# Patient Record
Sex: MALE | Race: OTHER | Employment: FULL TIME
[De-identification: names, ages, dates, MRNs, and addresses within clinical notes are randomized per-mention and may not be internally consistent; named-entity substitution may affect disease eponyms.]

---

## 2023-12-21 ENCOUNTER — APPOINTMENT (OUTPATIENT)
Facility: HOSPITAL | Age: 28
DRG: 100 | End: 2023-12-21

## 2023-12-21 ENCOUNTER — HOSPITAL ENCOUNTER (INPATIENT)
Facility: HOSPITAL | Age: 28
Discharge: HOME | DRG: 100 | End: 2023-12-24

## 2023-12-21 ENCOUNTER — HOSPITAL ENCOUNTER (INPATIENT)
Facility: HOSPITAL | Age: 28
LOS: 1 days | Discharge: HOME | DRG: 100 | End: 2023-12-22
Attending: EMERGENCY MEDICINE | Admitting: INTERNAL MEDICINE

## 2023-12-21 DIAGNOSIS — R90.89 ABNORMAL CT OF BRAIN: ICD-10-CM

## 2023-12-21 DIAGNOSIS — R56.9 SEIZURE (HCC): Primary | ICD-10-CM

## 2023-12-21 PROBLEM — E87.20 LACTIC ACIDOSIS: Status: ACTIVE | Noted: 2023-12-21

## 2023-12-21 LAB
ALBUMIN SERPL-MCNC: 4.1 G/DL (ref 3.5–5)
ALBUMIN/GLOB SERPL: 1 (ref 1.1–2.2)
ALP SERPL-CCNC: 80 U/L (ref 45–117)
ALT SERPL-CCNC: 30 U/L (ref 12–78)
AMPHET UR QL SCN: NEGATIVE
ANION GAP SERPL CALC-SCNC: 7 MMOL/L (ref 5–15)
AST SERPL-CCNC: 23 U/L (ref 15–37)
BARBITURATES UR QL SCN: NEGATIVE
BASOPHILS # BLD: 0.2 K/UL (ref 0–0.1)
BASOPHILS NFR BLD: 2 % (ref 0–1)
BENZODIAZ UR QL: POSITIVE
BILIRUB SERPL-MCNC: 0.7 MG/DL (ref 0.2–1)
BUN SERPL-MCNC: 15 MG/DL (ref 6–20)
BUN/CREAT SERPL: 13 (ref 12–20)
CALCIUM SERPL-MCNC: 9.3 MG/DL (ref 8.5–10.1)
CANNABINOIDS UR QL SCN: NEGATIVE
CHLORIDE SERPL-SCNC: 105 MMOL/L (ref 97–108)
CO2 SERPL-SCNC: 25 MMOL/L (ref 21–32)
COCAINE UR QL SCN: NEGATIVE
COMMENT:: NORMAL
CREAT SERPL-MCNC: 1.2 MG/DL (ref 0.7–1.3)
DIFFERENTIAL METHOD BLD: ABNORMAL
EOSINOPHIL # BLD: 0.3 K/UL (ref 0–0.4)
EOSINOPHIL NFR BLD: 3 % (ref 0–7)
ERYTHROCYTE [DISTWIDTH] IN BLOOD BY AUTOMATED COUNT: 12.9 % (ref 11.5–14.5)
GLOBULIN SER CALC-MCNC: 4 G/DL (ref 2–4)
GLUCOSE SERPL-MCNC: 105 MG/DL (ref 65–100)
HCT VFR BLD AUTO: 47.2 % (ref 36.6–50.3)
HGB BLD-MCNC: 15.6 G/DL (ref 12.1–17)
IMM GRANULOCYTES # BLD AUTO: 0 K/UL
IMM GRANULOCYTES NFR BLD AUTO: 0 %
LACTATE BLD-SCNC: 5.78 MMOL/L (ref 0.4–2)
LYMPHOCYTES # BLD: 2.7 K/UL (ref 0.8–3.5)
LYMPHOCYTES NFR BLD: 29 % (ref 12–49)
Lab: ABNORMAL
MAGNESIUM SERPL-MCNC: 2.2 MG/DL (ref 1.6–2.4)
MCH RBC QN AUTO: 26 PG (ref 26–34)
MCHC RBC AUTO-ENTMCNC: 33.1 G/DL (ref 30–36.5)
MCV RBC AUTO: 78.8 FL (ref 80–99)
METHADONE UR QL: NEGATIVE
MONOCYTES # BLD: 0.3 K/UL (ref 0–1)
MONOCYTES NFR BLD: 3 % (ref 5–13)
NEUTS BAND NFR BLD MANUAL: 6 % (ref 0–6)
NEUTS SEG # BLD: 5.7 K/UL (ref 1.8–8)
NEUTS SEG NFR BLD: 57 % (ref 32–75)
NRBC # BLD: 0 K/UL (ref 0–0.01)
NRBC BLD-RTO: 0 PER 100 WBC
OPIATES UR QL: NEGATIVE
PCP UR QL: NEGATIVE
PLATELET # BLD AUTO: 287 K/UL (ref 150–400)
PMV BLD AUTO: 9.7 FL (ref 8.9–12.9)
POTASSIUM SERPL-SCNC: 4 MMOL/L (ref 3.5–5.1)
PROT SERPL-MCNC: 8.1 G/DL (ref 6.4–8.2)
RBC # BLD AUTO: 5.99 M/UL (ref 4.1–5.7)
RBC MORPH BLD: ABNORMAL
SODIUM SERPL-SCNC: 137 MMOL/L (ref 136–145)
SPECIMEN HOLD: NORMAL
SPECIMEN HOLD: NORMAL
TROPONIN I SERPL HS-MCNC: <4 NG/L (ref 0–76)
WBC # BLD AUTO: 9.2 K/UL (ref 4.1–11.1)
WBC MORPH BLD: ABNORMAL

## 2023-12-21 PROCEDURE — 36415 COLL VENOUS BLD VENIPUNCTURE: CPT

## 2023-12-21 PROCEDURE — 83735 ASSAY OF MAGNESIUM: CPT

## 2023-12-21 PROCEDURE — 4A03X5D MEASUREMENT OF ARTERIAL FLOW, INTRACRANIAL, EXTERNAL APPROACH: ICD-10-PCS | Performed by: INTERNAL MEDICINE

## 2023-12-21 PROCEDURE — 6360000002 HC RX W HCPCS: Performed by: EMERGENCY MEDICINE

## 2023-12-21 PROCEDURE — 96374 THER/PROPH/DIAG INJ IV PUSH: CPT

## 2023-12-21 PROCEDURE — 6360000004 HC RX CONTRAST MEDICATION: Performed by: RADIOLOGY

## 2023-12-21 PROCEDURE — 70450 CT HEAD/BRAIN W/O DYE: CPT

## 2023-12-21 PROCEDURE — A9579 GAD-BASE MR CONTRAST NOS,1ML: HCPCS | Performed by: RADIOLOGY

## 2023-12-21 PROCEDURE — 99285 EMERGENCY DEPT VISIT HI MDM: CPT

## 2023-12-21 PROCEDURE — 2580000003 HC RX 258: Performed by: INTERNAL MEDICINE

## 2023-12-21 PROCEDURE — 80053 COMPREHEN METABOLIC PANEL: CPT

## 2023-12-21 PROCEDURE — 1100000000 HC RM PRIVATE

## 2023-12-21 PROCEDURE — 6370000000 HC RX 637 (ALT 250 FOR IP): Performed by: INTERNAL MEDICINE

## 2023-12-21 PROCEDURE — 80307 DRUG TEST PRSMV CHEM ANLYZR: CPT

## 2023-12-21 PROCEDURE — 71045 X-RAY EXAM CHEST 1 VIEW: CPT

## 2023-12-21 PROCEDURE — 70553 MRI BRAIN STEM W/O & W/DYE: CPT

## 2023-12-21 PROCEDURE — 6360000004 HC RX CONTRAST MEDICATION: Performed by: EMERGENCY MEDICINE

## 2023-12-21 PROCEDURE — 70498 CT ANGIOGRAPHY NECK: CPT

## 2023-12-21 PROCEDURE — 2580000003 HC RX 258: Performed by: EMERGENCY MEDICINE

## 2023-12-21 PROCEDURE — 83605 ASSAY OF LACTIC ACID: CPT

## 2023-12-21 PROCEDURE — 84484 ASSAY OF TROPONIN QUANT: CPT

## 2023-12-21 PROCEDURE — 6360000002 HC RX W HCPCS: Performed by: INTERNAL MEDICINE

## 2023-12-21 PROCEDURE — 85025 COMPLETE CBC W/AUTO DIFF WBC: CPT

## 2023-12-21 RX ORDER — ACETAMINOPHEN 650 MG/1
650 SUPPOSITORY RECTAL EVERY 6 HOURS PRN
Status: DISCONTINUED | OUTPATIENT
Start: 2023-12-21 | End: 2023-12-22 | Stop reason: HOSPADM

## 2023-12-21 RX ORDER — SODIUM CHLORIDE 9 MG/ML
INJECTION, SOLUTION INTRAVENOUS PRN
Status: DISCONTINUED | OUTPATIENT
Start: 2023-12-21 | End: 2023-12-22 | Stop reason: HOSPADM

## 2023-12-21 RX ORDER — POLYETHYLENE GLYCOL 3350 17 G/17G
17 POWDER, FOR SOLUTION ORAL DAILY PRN
Status: DISCONTINUED | OUTPATIENT
Start: 2023-12-21 | End: 2023-12-22 | Stop reason: HOSPADM

## 2023-12-21 RX ORDER — LEVETIRACETAM 500 MG/5ML
500 INJECTION, SOLUTION, CONCENTRATE INTRAVENOUS EVERY 12 HOURS
Status: DISCONTINUED | OUTPATIENT
Start: 2023-12-21 | End: 2023-12-22 | Stop reason: HOSPADM

## 2023-12-21 RX ORDER — ENOXAPARIN SODIUM 100 MG/ML
40 INJECTION SUBCUTANEOUS
Status: DISCONTINUED | OUTPATIENT
Start: 2023-12-21 | End: 2023-12-22 | Stop reason: HOSPADM

## 2023-12-21 RX ORDER — 0.9 % SODIUM CHLORIDE 0.9 %
1000 INTRAVENOUS SOLUTION INTRAVENOUS ONCE
Status: COMPLETED | OUTPATIENT
Start: 2023-12-21 | End: 2023-12-21

## 2023-12-21 RX ORDER — SODIUM CHLORIDE 0.9 % (FLUSH) 0.9 %
5-40 SYRINGE (ML) INJECTION PRN
Status: DISCONTINUED | OUTPATIENT
Start: 2023-12-21 | End: 2023-12-22 | Stop reason: HOSPADM

## 2023-12-21 RX ORDER — POTASSIUM CHLORIDE 7.45 MG/ML
10 INJECTION INTRAVENOUS PRN
Status: DISCONTINUED | OUTPATIENT
Start: 2023-12-21 | End: 2023-12-22 | Stop reason: HOSPADM

## 2023-12-21 RX ORDER — ONDANSETRON 2 MG/ML
4 INJECTION INTRAMUSCULAR; INTRAVENOUS EVERY 6 HOURS PRN
Status: DISCONTINUED | OUTPATIENT
Start: 2023-12-21 | End: 2023-12-22 | Stop reason: HOSPADM

## 2023-12-21 RX ORDER — POTASSIUM CHLORIDE 750 MG/1
40 TABLET, FILM COATED, EXTENDED RELEASE ORAL PRN
Status: DISCONTINUED | OUTPATIENT
Start: 2023-12-21 | End: 2023-12-22 | Stop reason: HOSPADM

## 2023-12-21 RX ORDER — SODIUM CHLORIDE 0.9 % (FLUSH) 0.9 %
5-40 SYRINGE (ML) INJECTION EVERY 12 HOURS SCHEDULED
Status: DISCONTINUED | OUTPATIENT
Start: 2023-12-21 | End: 2023-12-22 | Stop reason: HOSPADM

## 2023-12-21 RX ORDER — ACETAMINOPHEN 325 MG/1
650 TABLET ORAL EVERY 6 HOURS PRN
Status: DISCONTINUED | OUTPATIENT
Start: 2023-12-21 | End: 2023-12-22 | Stop reason: HOSPADM

## 2023-12-21 RX ORDER — LEVETIRACETAM 500 MG/5ML
1000 INJECTION, SOLUTION, CONCENTRATE INTRAVENOUS ONCE
Status: COMPLETED | OUTPATIENT
Start: 2023-12-21 | End: 2023-12-21

## 2023-12-21 RX ORDER — MIDAZOLAM HYDROCHLORIDE 2 MG/2ML
0.5 INJECTION, SOLUTION INTRAMUSCULAR; INTRAVENOUS ONCE
Status: DISCONTINUED | OUTPATIENT
Start: 2023-12-21 | End: 2023-12-21

## 2023-12-21 RX ORDER — MAGNESIUM SULFATE IN WATER 40 MG/ML
2000 INJECTION, SOLUTION INTRAVENOUS PRN
Status: DISCONTINUED | OUTPATIENT
Start: 2023-12-21 | End: 2023-12-22 | Stop reason: HOSPADM

## 2023-12-21 RX ORDER — ONDANSETRON 4 MG/1
4 TABLET, ORALLY DISINTEGRATING ORAL EVERY 8 HOURS PRN
Status: DISCONTINUED | OUTPATIENT
Start: 2023-12-21 | End: 2023-12-22 | Stop reason: HOSPADM

## 2023-12-21 RX ORDER — MIDAZOLAM HYDROCHLORIDE 2 MG/2ML
1 INJECTION, SOLUTION INTRAMUSCULAR; INTRAVENOUS ONCE
Status: COMPLETED | OUTPATIENT
Start: 2023-12-21 | End: 2023-12-21

## 2023-12-21 RX ORDER — SODIUM CHLORIDE 9 MG/ML
INJECTION, SOLUTION INTRAVENOUS CONTINUOUS
Status: DISCONTINUED | OUTPATIENT
Start: 2023-12-21 | End: 2023-12-22 | Stop reason: HOSPADM

## 2023-12-21 RX ADMIN — SODIUM CHLORIDE 1000 ML: 9 INJECTION, SOLUTION INTRAVENOUS at 13:15

## 2023-12-21 RX ADMIN — MIDAZOLAM HYDROCHLORIDE 1 MG: 1 INJECTION, SOLUTION INTRAMUSCULAR; INTRAVENOUS at 13:17

## 2023-12-21 RX ADMIN — LEVETIRACETAM 500 MG: 100 INJECTION, SOLUTION INTRAVENOUS at 21:03

## 2023-12-21 RX ADMIN — SALINE NASAL SPRAY 1 SPRAY: 1.5 SOLUTION NASAL at 16:55

## 2023-12-21 RX ADMIN — LEVETIRACETAM 1000 MG: 100 INJECTION, SOLUTION INTRAVENOUS at 15:03

## 2023-12-21 RX ADMIN — GADOTERIDOL 15 ML: 279.3 INJECTION, SOLUTION INTRAVENOUS at 21:59

## 2023-12-21 RX ADMIN — IOPAMIDOL 100 ML: 755 INJECTION, SOLUTION INTRAVENOUS at 14:50

## 2023-12-21 RX ADMIN — SODIUM CHLORIDE: 9 INJECTION, SOLUTION INTRAVENOUS at 16:42

## 2023-12-21 NOTE — ED TRIAGE NOTES
Patient arrived via EMS from [address] [place] [address] Dr. Per EMS, patient had witnessed seizure like activity with full body convulsions for 30 seconds that resolved spontaneously. Patient was assisted to ground. + LOC. Patient reports he was working this morning and went to [employer] to  lumber. Patient reports pain in upper back.  Denies abd pain, n/v/d, fevers/chills     + sore throat     Maribel Frankel EMT-P used for translation

## 2023-12-21 NOTE — DISCHARGE INSTRUCTIONS
HOSPITALIST DISCHARGE INSTRUCTIONS  NAME: Digna Carr   :  1995   MRN:  [id number]     Date/Time:  2023 1:13 PM    ADMIT DATE: 2023     DISCHARGE DATE: 2023         DISCHARGE DIAGNOSIS:  Seizure  Left frontal lobe AVM      DIET:  regular    ACTIVITY:  as tolerated    OTHER INSTRUCTIONS:    Do not drive. (this is to include: car, bicycle, or motorcycle)  Do not drive operate dangerous equipment such as power tools, heavy machinery with moving parts, or an open flame. Do not swim alone (this would include a bathtub full of water is a small swimming          MEDICATIONS:    It is important that you take the medication exactly as they are prescribed. Keep your medication in the bottles provided by the pharmacist and keep a list of the medication names, dosages, and times to be taken in your wallet. Do not take other medications without consulting your doctor. If you experience any of the following symptoms then please call your primary care physician or return to the emergency room if you cannot get hold of your doctor:  Fever, chills, nausea, vomiting, diarrhea, change in mentation, falling, bleeding, shortness of breath    Follow Up:  Please call and set up an appointment to see them in 1 week. Abhi Maher MD  [address] [place] [address]  [phone]    Schedule an appointment as soon as possible for a visit in 4 week(s)      Mata Sadler MD  [address] [place] [address] [address] [place] [address]  [phone]    Schedule an appointment as soon as possible for a visit          Information obtained by :  I understand that if any problems occur once I am at home I am to contact my physician. I understand and acknowledge receipt of the instructions indicated above.                                                                                                                                                Physician's or R.N.'s Signature

## 2023-12-21 NOTE — ED PROVIDER NOTES
with abnormal hyperdensity, order MRI with and without contrast, patient neurology. Notified that neurology will be by to evaluate and to order MRI w/wo    Discussed with neurology, recommending Keppra load, CTA as well to assess for vascular pathology in addition to the MRI. Will recommend admission to the hospital service and recommends Keppra 500 twice daily. Amount and/or Complexity of Data Reviewed  Labs: ordered. Decision-making details documented in ED Course. Radiology: ordered. ECG/medicine tests: ordered. Risk  Prescription drug management. Decision regarding hospitalization. REASSESSMENT     ED Course as of 12/21/23 1457   Thu Dec 21, 2023   1323 ECG obtained December 21, 2023 at 1300. Sinus tachycardia to 118, normal axis. Otherwise normal intervals. No ST segment changes. No STEMI [SK]   1400 Abnormal CT, will plan for MRI with and without contrast, patient neurology. Discussed with family as well 2. They are worried regarding if this is related to his chronic cough issues, additionally is noted that he has been using Afrin daily for a while due to his congestion symptoms. Discussed that this could also lead to hypertensive readings and rebound congestion at discontinuation.   Patient is pending MRI for further assessment and neurology [SK]   1406 CMP(!):    Sodium 137   Potassium 4.0   Chloride 105   CO2 25   Anion Gap 7   Glucose, Random 105(!)   BUN,BUNPL 15   Creatinine 1.20   Bun/Cre Ratio 13   Est, Glom Filt Rate >60   CALCIUM, SERUM, 709529 9.3   BILIRUBIN TOTAL 0.7   ALT 30   AST 23   Alk Phos 80   Total Protein 8.1   Albumin 4.1   Globulin 4.0   ALBUMIN/GLOBULIN RATIO 1.0(!)  Reassuring electrolytes [SK]   1406 CBC with Auto Differential(!):    WBC 9.2   RBC 5.99(!)   Hemoglobin Quant 15.6   Hematocrit 47.2   MCV 78.8(!)   MCH 26.0   MCHC 33.1   RDW 12.9   Platelet Count 107   MPV 9.7   Nucleated Red Blood Cells 0.0   Nucleated Red Blood Cells 0.00   Neutrophils % 57

## 2023-12-21 NOTE — H&P
[address] [place] [address] [address] [phone]    Hospital Medicine Admission History and Physical      NAME:  Kamila Fernandez   :   1995   MRN:  [id number]     PCP:  No primary care provider on file. Date of service:  2023         Subjective:     CHIEF COMPLAINT: Seizure    HISTORY OF PRESENT ILLNESS:     Mr. Thomas Lynn is a 29 y.o.  male who is admitted with seizure. Mr. Thomas Lynn without significant past medical history presented after he had a seizure episode. Patient was at a store when he suddenly started to stare blank and then lost consciousness. Later noted to have foaming his mouth, tonic-clonic body movements later on with rigidity of his body, which lasted for about 60 minutes. Later he had postictal.  No history of seizures in the past and not taking any medication, no alcohol or drug use. No past medical history on file. No past surgical history on file. Social History     Tobacco Use    Smoking status: Not on file    Smokeless tobacco: Not on file   Substance Use Topics    Alcohol use: Not on file        No family history on file.      No Known Allergies     Prior to Admission medications    Not on File         Review of Systems:  (bold if positive, if negative)    Gen:  Eyes:  ENT:  CVS:  Pulm:  GI:  :  MS:  Skin:  Psych:  Endo:  Hem:  Renal:  Neuro:  seizure          Objective:      VITALS:    Vital signs reviewed; most recent are:    Vitals:    23 1515   BP: 136/65   Pulse: (!) 111   Resp: 24   Temp:    SpO2: 99%     SpO2 Readings from Last 6 Encounters:   23 99%        No intake or output data in the 24 hours ending 23 1537         Exam:     Physical Exam:    Gen:  Well-developed, well-nourished, in no acute distress  HEENT:  Pink conjunctivae, PERRL, hearing intact to voice, moist mucous membranes  Neck:  Supple, without masses, thyroid non-tender  Resp:  No accessory muscle use, clear breath

## 2023-12-22 VITALS
RESPIRATION RATE: 16 BRPM | TEMPERATURE: 98 F | HEART RATE: 82 BPM | SYSTOLIC BLOOD PRESSURE: 136 MMHG | BODY MASS INDEX: 25.19 KG/M2 | WEIGHT: 179.9 LBS | OXYGEN SATURATION: 98 % | HEIGHT: 71 IN | DIASTOLIC BLOOD PRESSURE: 65 MMHG

## 2023-12-22 LAB
ANION GAP SERPL CALC-SCNC: 3 MMOL/L (ref 5–15)
BASOPHILS # BLD: 0 K/UL (ref 0–0.1)
BASOPHILS NFR BLD: 0 % (ref 0–1)
BUN SERPL-MCNC: 12 MG/DL (ref 6–20)
BUN/CREAT SERPL: 11 (ref 12–20)
CALCIUM SERPL-MCNC: 8.7 MG/DL (ref 8.5–10.1)
CHLORIDE SERPL-SCNC: 110 MMOL/L (ref 97–108)
CO2 SERPL-SCNC: 26 MMOL/L (ref 21–32)
CREAT SERPL-MCNC: 1.05 MG/DL (ref 0.7–1.3)
DIFFERENTIAL METHOD BLD: ABNORMAL
EOSINOPHIL # BLD: 0.1 K/UL (ref 0–0.4)
EOSINOPHIL NFR BLD: 1 % (ref 0–7)
ERYTHROCYTE [DISTWIDTH] IN BLOOD BY AUTOMATED COUNT: 13.1 % (ref 11.5–14.5)
GLUCOSE SERPL-MCNC: 99 MG/DL (ref 65–100)
HCT VFR BLD AUTO: 43.5 % (ref 36.6–50.3)
HGB BLD-MCNC: 14.3 G/DL (ref 12.1–17)
IMM GRANULOCYTES # BLD AUTO: 0 K/UL (ref 0–0.04)
IMM GRANULOCYTES NFR BLD AUTO: 0 % (ref 0–0.5)
LACTATE SERPL-SCNC: 1.1 MMOL/L (ref 0.4–2)
LYMPHOCYTES # BLD: 2.5 K/UL (ref 0.8–3.5)
LYMPHOCYTES NFR BLD: 25 % (ref 12–49)
MCH RBC QN AUTO: 26.1 PG (ref 26–34)
MCHC RBC AUTO-ENTMCNC: 32.9 G/DL (ref 30–36.5)
MCV RBC AUTO: 79.4 FL (ref 80–99)
MONOCYTES # BLD: 0.8 K/UL (ref 0–1)
MONOCYTES NFR BLD: 7 % (ref 5–13)
NEUTS SEG # BLD: 6.7 K/UL (ref 1.8–8)
NEUTS SEG NFR BLD: 67 % (ref 32–75)
NRBC # BLD: 0 K/UL (ref 0–0.01)
NRBC BLD-RTO: 0 PER 100 WBC
PLATELET # BLD AUTO: 263 K/UL (ref 150–400)
PMV BLD AUTO: 9.4 FL (ref 8.9–12.9)
POTASSIUM SERPL-SCNC: 3.7 MMOL/L (ref 3.5–5.1)
RBC # BLD AUTO: 5.48 M/UL (ref 4.1–5.7)
SODIUM SERPL-SCNC: 139 MMOL/L (ref 136–145)
WBC # BLD AUTO: 10.2 K/UL (ref 4.1–11.1)

## 2023-12-22 PROCEDURE — 99221 1ST HOSP IP/OBS SF/LOW 40: CPT | Performed by: PSYCHIATRY & NEUROLOGY

## 2023-12-22 PROCEDURE — 36415 COLL VENOUS BLD VENIPUNCTURE: CPT

## 2023-12-22 PROCEDURE — 6360000002 HC RX W HCPCS: Performed by: INTERNAL MEDICINE

## 2023-12-22 PROCEDURE — 94761 N-INVAS EAR/PLS OXIMETRY MLT: CPT

## 2023-12-22 PROCEDURE — 83605 ASSAY OF LACTIC ACID: CPT

## 2023-12-22 PROCEDURE — 85025 COMPLETE CBC W/AUTO DIFF WBC: CPT

## 2023-12-22 PROCEDURE — 80048 BASIC METABOLIC PNL TOTAL CA: CPT

## 2023-12-22 RX ORDER — LEVETIRACETAM 500 MG/1
500 TABLET ORAL 2 TIMES DAILY
Qty: 60 TABLET | Refills: 3 | OUTPATIENT
Start: 2023-12-22 | End: 2023-12-22

## 2023-12-22 RX ORDER — LEVETIRACETAM 500 MG/1
500 TABLET ORAL 2 TIMES DAILY
Qty: 60 TABLET | Refills: 3 | Status: SHIPPED | OUTPATIENT
Start: 2023-12-22

## 2023-12-22 RX ADMIN — ENOXAPARIN SODIUM 40 MG: 100 INJECTION SUBCUTANEOUS at 02:12

## 2023-12-22 RX ADMIN — LEVETIRACETAM 500 MG: 100 INJECTION, SOLUTION INTRAVENOUS at 08:43

## 2023-12-22 NOTE — CONSULTS
CONSULT - Neurology      Name:  Lisandro Harris       MRN: [id number]  Location: [facility]/10    Date: 12/22/2023  Time:  9:20 AM        Chief Complaint:   Chief Complaint   Patient presents with    Seizures       HPI:  It is a great pleasure to see Lisandro Harris, a 29 y.o. male today in the hospital . Briefly these are the events happened as per the chart and taken from the chart. The patient was doing fine and the symptoms started-  seizure . He suddenly started to stare blank with foaming  and tonic-clonic body movements for about 60 minutes and then postictal. The symptoms fluctuated in the beginning. The patient was brought to the emergency room for further evaluation. 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 Arterial supply predominantly derived from left SOCORRO and drainage through cortical veins. CTA head demonstrates large left frontal arteriovenous malformation with  nidus measuring up to 3.8 x 2.4 cm as detailed. PAST MEDICAL HISTORY:  No past medical history on file. PAST SURGICAL HISTORY:  No past surgical history on file. FAMILY HISTORY:  No family history on file. SOCIAL HISTORY:   Social History     Tobacco Use    Smoking status: Not on file    Smokeless tobacco: Not on file   Substance Use Topics    Alcohol use: Not on file      ALLERGIES:   No Known Allergies   HOME MEDICATIONS:  Prior to Admission medications    Not on File         CURRENT MEDICATIONS:  Note that completed medications (per the [facility]) continue to display for 24 hours. Ordered medications to be given in the future also display. I've reviewed the Past Medical History, Past Surgical History, Allergies, Medications, Family History (relevant to this case) and Social History and have updated these data in the electronic medical record. ROS:  All system were reviewed and were negative except for the pertinent positives listed in the HPI.     PHYSICAL EXAMINATION:  Most Recent Vital Microbiology dept for 2 days. If unpreserved urine is submitted, it cannot be used for addtional testing after 24 hours, recollection will be required. Basic Metabolic Panel w/ Reflex to MG    Collection Time: 12/22/23  2:13 AM   Result Value Ref Range    Sodium 139 136 - 145 mmol/L    Potassium 3.7 3.5 - 5.1 mmol/L    Chloride 110 (H) 97 - 108 mmol/L    CO2 26 21 - 32 mmol/L    Anion Gap 3 (L) 5 - 15 mmol/L    Glucose 99 65 - 100 mg/dL    BUN 12 6 - 20 MG/DL    Creatinine 1.05 0.70 - 1.30 MG/DL    Bun/Cre Ratio 11 (L) 12 - 20      Est, Glom Filt Rate >60 >60 ml/min/1.73m2    Calcium 8.7 8.5 - 10.1 MG/DL   CBC with Auto Differential    Collection Time: 12/22/23  2:13 AM   Result Value Ref Range    WBC 10.2 4.1 - 11.1 K/uL    RBC 5.48 4.10 - 5.70 M/uL    Hemoglobin 14.3 12.1 - 17.0 g/dL    Hematocrit 43.5 36.6 - 50.3 %    MCV 79.4 (L) 80.0 - 99.0 FL    MCH 26.1 26.0 - 34.0 PG    MCHC 32.9 30.0 - 36.5 g/dL    RDW 13.1 11.5 - 14.5 %    Platelets 984 321 - 353 K/uL    MPV 9.4 8.9 - 12.9 FL    Nucleated RBCs 0.0 0  WBC    nRBC 0.00 0.00 - 0.01 K/uL    Neutrophils % 67 32 - 75 %    Lymphocytes % 25 12 - 49 %    Monocytes % 7 5 - 13 %    Eosinophils % 1 0 - 7 %    Basophils % 0 0 - 1 %    Immature Granulocytes 0 0.0 - 0.5 %    Neutrophils Absolute 6.7 1.8 - 8.0 K/UL    Lymphocytes Absolute 2.5 0.8 - 3.5 K/UL    Monocytes Absolute 0.8 0.0 - 1.0 K/UL    Eosinophils Absolute 0.1 0.0 - 0.4 K/UL    Basophils Absolute 0.0 0.0 - 0.1 K/UL    Absolute Immature Granulocyte 0.0 0.00 - 0.04 K/UL    Differential Type AUTOMATED     Lactic Acid    Collection Time: 12/22/23  2:13 AM   Result Value Ref Range    Lactic Acid, Plasma 1.1 0.4 - 2.0 MMOL/L       DIAGNOSTICS:  All neuro Images were reviewed in detail       IMPRESSION:    It is a great pleasure to see Sera Dominguez, a 29 y.o. male today in the hospital. Based on history, physical, lab and imaging data, the most likely suspected mechanism is seizure.  MRI brain showed Left

## 2023-12-22 NOTE — PROGRESS NOTES
Discharge instructions reviewed with patient and his family  member who voiced understanding. IV removed.

## 2023-12-22 NOTE — ED NOTES
Verbal shift change report given to PALLAVI roberson (oncoming nurse) by Santiago Redding RN (offgoing nurse). Report included the following information Nurse Handoff Report, Index, ED SBAR, MAR, and Recent Results.

## 2023-12-22 NOTE — DISCHARGE SUMMARY
Hospitalist Discharge Summary     Patient ID:  Bernie Calderon  [id number]  35 y.o.  1995    Admit date: 12/21/2023    Discharge date and time: 12/22/2023    Admission Diagnoses: Seizure [facility]) [R56.9]      Discharge Diagnoses:      Principal Problem:    Seizure ([address])  Active Problems:    Lactic acidosis    Abnormal CT of brain  Resolved Problems:    * No resolved hospital problems. *       Seizure, first new onset  Left frontal lobe AVM  Lactic acidodis      Hospital Course:     Bernie Calderon  is a 29 y.o. Tongan speaking male with no prior medical history presenting with a seizure. Patient was at a store when he suddenly started to stare blank and then lost consciousness. Later noted to have foaming his mouth, tonic-clonic body movements later on with rigidity of his body, which lasted for about 60 minutes. Later he was postictal.  No history of seizures in the past and not taking any medication, no alcohol or drug use. Was admitted to the hospitalist service. Neurology was consulted he was monitored on seizure precautions. Keppra 500 mg IV twice daily was started. CT of the head without contrast was concerning for hyperdensity in the left frontal lobe. MRI of the brain was performed which showed 2.9 x 2.6 cm left frontal lobe arteriovenous malformation with arterial supply predominantly deriving from the left SOCORRO and drainage through the cortical veins. CTA of the head demonstrated large left frontal arteriovenous malformation with nidus measuring to 3.8 x 2.5 cm. There is no large vessel occlusion or high-grade stenosis or aneurysm. Patient had no further seizure activity. He was stable for discharge on Keppra 500 mg p.o. twice daily. Will follow-up with neurology and a referral to Dr. Gamaliel Collins in neurointerventional radiology at [facility] was placed. Patient was counseled on seizure precautions including no driving, heavy machinery, or swimming alone for 6 months.     Finally the patient Home          Medication List      You have not been prescribed any medications. I have reviewed discharge instructions with the patient and family, and they verbalized understanding.      Approximate time spent in patient care on day of discharge: 40 mins    Signed:  Brenda Barnett MD  12/22/2023  1:14 PM

## 2023-12-22 NOTE — ED NOTES
Verbal shift change report given to [address] [place] [address] (oncoming nurse) by Nadia Llamas (offgoing nurse). Report included the following information Nurse Handoff Report, Index, ED Encounter Summary, ED SBAR, Adult Overview, MAR, and Recent Results.